# Patient Record
(demographics unavailable — no encounter records)

---

## 2024-12-18 NOTE — PHYSICAL EXAM
[TextEntry] : Crying, uncooperative-- limtied exam HEENT: Normal. CV: RRR, normal S1 and S2, possible murmur. Skin: Pink and warm.

## 2024-12-18 NOTE — HISTORY OF PRESENT ILLNESS
[FreeTextEntry1] : To whom it may concern:   I had the pleasure of seeing your patient, BAIRON OGDEN, in my office today, 12/18/2024. As you know, she is a 20 month old female referred to pediatric cardiology due to murmur.   Bairon was recently noted to have a murmur during a well visit.  No history of cardiac symptoms.  She has been developing normally. No fevers or URI symptoms. No family history of congenital heart disease or sudden/unexplained death. No family member with a known genetic syndrome.

## 2024-12-18 NOTE — DISCUSSION/SUMMARY
[FreeTextEntry1] : In summary, BAIRON is a 20 month old female here for murmur. Limited exam, possible murmur. Limited echo grossly normal. Reassurance. Due to limited echo, recommend follow up in ~3 years if murmur persists, at which time patient will hopefully be better able to tolerate echo. If murmur resolves, no further work up/evaluation. High suspicion for benign murmur based on history, exam, grossly normal echocardiogram.    Plan: - See above.  - No activity restrictions. - No SBE prophylaxis.     Please do not hesitate to contact me if you have any questions.   Richard Mcdonald MD, MS, FAAP, FACC Attending Physician, Pediatric Cardiology [facility] Physician [address], [address] [place] [address] Office: [phone] Fax: [phone] Email: [email or web address]     I have spent 50 minutes of time on the encounter excluding separately reported services.

## 2024-12-18 NOTE — CARDIOLOGY SUMMARY
[Today's Date] : [unfilled] [FreeTextEntry1] : Unable to obtain. [FreeTextEntry2] : See report; limited. Grossly normal.